# Patient Record
Sex: MALE | Race: WHITE | NOT HISPANIC OR LATINO | URBAN - METROPOLITAN AREA
[De-identification: names, ages, dates, MRNs, and addresses within clinical notes are randomized per-mention and may not be internally consistent; named-entity substitution may affect disease eponyms.]

---

## 2020-07-24 ENCOUNTER — WEB ENCOUNTER (OUTPATIENT)
Dept: URBAN - METROPOLITAN AREA CLINIC 80 | Facility: CLINIC | Age: 50
End: 2020-07-24

## 2020-07-24 ENCOUNTER — OFFICE VISIT (OUTPATIENT)
Dept: URBAN - METROPOLITAN AREA CLINIC 80 | Facility: CLINIC | Age: 50
End: 2020-07-24
Payer: COMMERCIAL

## 2020-07-24 DIAGNOSIS — K50.118 CROHN'S DISEASE OF LARGE INTESTINE WITH OTHER COMPLICATION: ICD-10-CM

## 2020-07-24 PROBLEM — 7620006: Status: ACTIVE | Noted: 2020-07-24

## 2020-07-24 PROCEDURE — 3017F COLORECTAL CA SCREEN DOC REV: CPT | Performed by: INTERNAL MEDICINE

## 2020-07-24 PROCEDURE — G9903 PT SCRN TBCO ID AS NON USER: HCPCS | Performed by: INTERNAL MEDICINE

## 2020-07-24 PROCEDURE — 99213 OFFICE O/P EST LOW 20 MIN: CPT | Performed by: INTERNAL MEDICINE

## 2020-07-24 PROCEDURE — G8427 DOCREV CUR MEDS BY ELIG CLIN: HCPCS | Performed by: INTERNAL MEDICINE

## 2020-07-24 PROCEDURE — G9716 BMI DOC ONL FUP NOT CMPLTD: HCPCS | Performed by: INTERNAL MEDICINE

## 2020-07-24 RX ORDER — OMEPRAZOLE 20 MG/1
CAPSULE, DELAYED RELEASE ORAL
Qty: 0 | Refills: 0 | Status: ACTIVE | COMMUNITY
Start: 1900-01-01 | End: 1900-01-01

## 2020-07-24 RX ORDER — PREDNISONE 10 MG/1
TABLET ORAL
Qty: 0 | Refills: 0 | Status: ACTIVE | COMMUNITY
Start: 1900-01-01 | End: 1900-01-01

## 2020-07-24 RX ORDER — MERCAPTOPURINE 50 MG/1
TAKE 2 TABLETS BY ORAL ROUTE DAILY FOR 90 DAYS TABLET ORAL 1
Qty: 180 | Refills: 1 | Status: ACTIVE | COMMUNITY
Start: 2020-02-17 | End: 2020-08-15

## 2020-07-24 RX ORDER — DEXLANSOPRAZOLE 60 MG/1
TAKE 1 CAPSULE (60 MG) BY ORAL ROUTE ONCE DAILY FOR 30 DAYS CAPSULE, DELAYED RELEASE ORAL 1
Qty: 30 | Refills: 2 | Status: ACTIVE | COMMUNITY
Start: 2017-11-22 | End: 1900-01-01

## 2020-07-24 RX ORDER — MERCAPTOPURINE 50 MG/1
AS DIRECTED TABLET ORAL
Qty: 180 | Refills: 3 | OUTPATIENT
Start: 2020-07-24

## 2020-07-25 LAB
A/G RATIO: 1.4
ALBUMIN: 4.2
ALKALINE PHOSPHATASE: 89
ALT (SGPT): 35
AST (SGOT): 23
BILIRUBIN, TOTAL: 0.3
BUN/CREATININE RATIO: 15
BUN: 15
C-REACTIVE PROTEIN, QUANT: 8
CALCIUM: 9.1
CARBON DIOXIDE, TOTAL: 23
CHLORIDE: 104
CREATININE: 0.98
EGFR IF AFRICN AM: 102
EGFR IF NONAFRICN AM: 88
GLOBULIN, TOTAL: 3
GLUCOSE: 119
HEMATOCRIT: 42
HEMOGLOBIN: 13.9
MCH: 32.3
MCHC: 33.1
MCV: 98
NRBC: (no result)
PLATELETS: 244
POTASSIUM: 3.9
PROTEIN, TOTAL: 7.2
RBC: 4.3
RDW: 13.9
SODIUM: 141
T4,FREE(DIRECT): 1.14
TSH: 1.95
WBC: 4.4

## 2020-10-13 ENCOUNTER — ERX REFILL RESPONSE (OUTPATIENT)
Age: 50
End: 2020-10-13

## 2020-10-13 RX ORDER — MERCAPTOPURINE 50 MG/1
TAKE 2 TABLETS BY MOUTH EVERY DAY TABLET ORAL
Qty: 180 | Refills: 0

## 2021-01-26 ENCOUNTER — ERX REFILL RESPONSE (OUTPATIENT)
Age: 51
End: 2021-01-26

## 2021-01-26 RX ORDER — MERCAPTOPURINE 50 MG/1
TAKE 2 TABLETS BY MOUTH EVERY DAY TABLET ORAL
Qty: 180 | Refills: 0

## 2021-01-29 ENCOUNTER — OFFICE VISIT (OUTPATIENT)
Dept: URBAN - METROPOLITAN AREA CLINIC 80 | Facility: CLINIC | Age: 51
End: 2021-01-29
Payer: COMMERCIAL

## 2021-01-29 ENCOUNTER — WEB ENCOUNTER (OUTPATIENT)
Dept: URBAN - METROPOLITAN AREA CLINIC 80 | Facility: CLINIC | Age: 51
End: 2021-01-29

## 2021-01-29 DIAGNOSIS — K50.812 CROHN'S DISEASE OF BOTH SMALL AND LARGE INTESTINE WITH INTESTINAL OBSTRUCTION: ICD-10-CM

## 2021-01-29 PROBLEM — 71833008: Status: ACTIVE | Noted: 2021-01-29

## 2021-01-29 PROCEDURE — 1036F TOBACCO NON-USER: CPT | Performed by: INTERNAL MEDICINE

## 2021-01-29 PROCEDURE — G8482 FLU IMMUNIZE ORDER/ADMIN: HCPCS | Performed by: INTERNAL MEDICINE

## 2021-01-29 PROCEDURE — G9903 PT SCRN TBCO ID AS NON USER: HCPCS | Performed by: INTERNAL MEDICINE

## 2021-01-29 PROCEDURE — G8427 DOCREV CUR MEDS BY ELIG CLIN: HCPCS | Performed by: INTERNAL MEDICINE

## 2021-01-29 PROCEDURE — G8417 CALC BMI ABV UP PARAM F/U: HCPCS | Performed by: INTERNAL MEDICINE

## 2021-01-29 PROCEDURE — 99213 OFFICE O/P EST LOW 20 MIN: CPT | Performed by: INTERNAL MEDICINE

## 2021-01-29 PROCEDURE — 3017F COLORECTAL CA SCREEN DOC REV: CPT | Performed by: INTERNAL MEDICINE

## 2021-01-29 RX ORDER — DEXLANSOPRAZOLE 60 MG/1
TAKE 1 CAPSULE (60 MG) BY ORAL ROUTE ONCE DAILY FOR 30 DAYS CAPSULE, DELAYED RELEASE ORAL 1
Qty: 30 | Refills: 2 | Status: ACTIVE | COMMUNITY
Start: 2017-11-22 | End: 1900-01-01

## 2021-01-29 RX ORDER — PREDNISONE 10 MG/1
TABLET ORAL
Qty: 0 | Refills: 0 | Status: ACTIVE | COMMUNITY
Start: 1900-01-01 | End: 1900-01-01

## 2021-01-29 RX ORDER — OMEPRAZOLE 20 MG/1
CAPSULE, DELAYED RELEASE ORAL
Qty: 0 | Refills: 0 | Status: ACTIVE | COMMUNITY
Start: 1900-01-01 | End: 1900-01-01

## 2021-01-29 RX ORDER — MERCAPTOPURINE 50 MG/1
TAKE 2 TABLETS BY MOUTH EVERY DAY TABLET ORAL
Qty: 180 | Refills: 0 | Status: ACTIVE | COMMUNITY

## 2021-01-29 RX ORDER — MERCAPTOPURINE 50 MG/1
TAKE 2 TABLETS BY MOUTH EVERY DAY TABLET ORAL DAILY
Qty: 180 | Refills: 2

## 2021-01-29 NOTE — HPI-TODAY'S VISIT:
He comes in today for interval follow up.  He has remained compliant with his 6MP.  He has no GI symptoms.  He is considering buying a new truck next year.

## 2021-01-30 LAB
A/G RATIO: 1.3
ALBUMIN: 4.4
ALKALINE PHOSPHATASE: 96
ALT (SGPT): 32
AST (SGOT): 20
BASO (ABSOLUTE): 0
BASOS: 0
BILIRUBIN, TOTAL: 0.3
BUN/CREATININE RATIO: 18
BUN: 18
C-REACTIVE PROTEIN, QUANT: 12
CALCIUM: 9.6
CARBON DIOXIDE, TOTAL: 25
CHLORIDE: 104
CREATININE: 1.02
EGFR IF AFRICN AM: 97
EGFR IF NONAFRICN AM: 84
EOS (ABSOLUTE): 0.1
EOS: 1
GLOBULIN, TOTAL: 3.3
GLUCOSE: 85
HEMATOCRIT: 42.7
HEMATOLOGY COMMENTS:: (no result)
HEMOGLOBIN: 14.9
IMMATURE CELLS: (no result)
IMMATURE GRANS (ABS): 0
IMMATURE GRANULOCYTES: 0
LYMPHS (ABSOLUTE): 1.2
LYMPHS: 22
MCH: 34.6
MCHC: 34.9
MCV: 99
MONOCYTES(ABSOLUTE): 0.7
MONOCYTES: 12
NEUTROPHILS (ABSOLUTE): 3.5
NEUTROPHILS: 65
NRBC: (no result)
PLATELETS: 266
POTASSIUM: 4.1
PROTEIN, TOTAL: 7.7
RBC: 4.31
RDW: 13.5
SODIUM: 142
WBC: 5.4

## 2021-08-24 ENCOUNTER — ERX REFILL RESPONSE (OUTPATIENT)
Dept: URBAN - METROPOLITAN AREA CLINIC 80 | Facility: CLINIC | Age: 51
End: 2021-08-24

## 2021-08-24 RX ORDER — MERCAPTOPURINE 50 MG/1
TAKE 2 TABLETS BY MOUTH EVERY DAY TABLET ORAL
Qty: 180 TABLET | Refills: 0 | OUTPATIENT

## 2021-08-24 RX ORDER — MERCAPTOPURINE 50 MG/1
TAKE 2 TABLETS BY MOUTH EVERY DAY TABLET ORAL
Qty: 180 TABLET | Refills: 1 | OUTPATIENT

## 2022-10-07 ENCOUNTER — OFFICE VISIT (OUTPATIENT)
Dept: URBAN - METROPOLITAN AREA CLINIC 80 | Facility: CLINIC | Age: 52
End: 2022-10-07
Payer: COMMERCIAL

## 2022-10-07 ENCOUNTER — LAB OUTSIDE AN ENCOUNTER (OUTPATIENT)
Dept: URBAN - METROPOLITAN AREA CLINIC 80 | Facility: CLINIC | Age: 52
End: 2022-10-07

## 2022-10-07 VITALS
TEMPERATURE: 97.3 F | SYSTOLIC BLOOD PRESSURE: 151 MMHG | HEART RATE: 86 BPM | WEIGHT: 289.2 LBS | DIASTOLIC BLOOD PRESSURE: 86 MMHG | BODY MASS INDEX: 41.4 KG/M2 | HEIGHT: 70 IN

## 2022-10-07 DIAGNOSIS — K50.918 CROHN'S DISEASE WITH OTHER COMPLICATION, UNSPECIFIED GASTROINTESTINAL TRACT LOCATION: ICD-10-CM

## 2022-10-07 PROBLEM — 34000006: Status: ACTIVE | Noted: 2022-10-07

## 2022-10-07 PROCEDURE — 99213 OFFICE O/P EST LOW 20 MIN: CPT | Performed by: INTERNAL MEDICINE

## 2022-10-07 RX ORDER — PREDNISONE 10 MG/1
TABLET ORAL
Qty: 0 | Refills: 0 | Status: ACTIVE | COMMUNITY
Start: 1900-01-01 | End: 1900-01-01

## 2022-10-07 RX ORDER — OMEPRAZOLE 20 MG/1
CAPSULE, DELAYED RELEASE ORAL
Qty: 0 | Refills: 0 | Status: ACTIVE | COMMUNITY
Start: 1900-01-01 | End: 1900-01-01

## 2022-10-07 RX ORDER — MERCAPTOPURINE 50 MG/1
TAKE 2 TABLETS BY MOUTH EVERY DAY TABLET ORAL
Qty: 180 TABLET | Refills: 1

## 2022-10-07 RX ORDER — DEXLANSOPRAZOLE 60 MG/1
TAKE 1 CAPSULE (60 MG) BY ORAL ROUTE ONCE DAILY FOR 30 DAYS CAPSULE, DELAYED RELEASE ORAL 1
Qty: 30 | Refills: 2 | Status: ACTIVE | COMMUNITY
Start: 2017-11-22 | End: 1900-01-01

## 2022-10-07 RX ORDER — MERCAPTOPURINE 50 MG/1
TAKE 2 TABLETS BY MOUTH EVERY DAY TABLET ORAL
Qty: 180 TABLET | Refills: 1 | Status: ACTIVE | COMMUNITY

## 2022-10-07 NOTE — HPI-TODAY'S VISIT:
He comes in today for interval follow up.  His GI symptoms remain well controlled on mercaptopurine alone.  He was able to find a brand new [employer] truck and loves it.  He is due for surveillance colonoscopy.

## 2022-10-11 LAB
A/G RATIO: 1.5
ABSOLUTE BASOPHILS: 29
ABSOLUTE EOSINOPHILS: 268
ABSOLUTE LYMPHOCYTES: 1465
ABSOLUTE MONOCYTES: 496
ABSOLUTE NEUTROPHILS: 3443
ALBUMIN: 4.2
ALKALINE PHOSPHATASE: 87
ALT (SGPT): 35
AST (SGOT): 20
BASOPHILS: 0.5
BILIRUBIN, TOTAL: 0.6
BUN/CREATININE RATIO: (no result)
BUN: 15
C-REACTIVE PROTEIN, QUANT: 11.6
CALCIUM: 9.5
CARBON DIOXIDE, TOTAL: 27
CHLORIDE: 106
CREATININE: 1.03
EGFR: 86
EOSINOPHILS: 4.7
GLOBULIN, TOTAL: 2.8
GLUCOSE: 91
HEMATOCRIT: 46.3
HEMOGLOBIN: 16
LYMPHOCYTES: 25.7
MCH: 31.4
MCHC: 34.6
MCV: 91
MONOCYTES: 8.7
MPV: 9.7
NEUTROPHILS: 60.4
PLATELET COUNT: 243
POTASSIUM: 4
PROTEIN, TOTAL: 7
RDW: 13.1
RED BLOOD CELL COUNT: 5.09
SODIUM: 141
WHITE BLOOD CELL COUNT: 5.7

## 2023-01-16 PROBLEM — 34000006 CROHN'S DISEASE: Status: ACTIVE | Noted: 2023-01-16

## 2023-05-08 ENCOUNTER — CLAIMS CREATED FROM THE CLAIM WINDOW (OUTPATIENT)
Dept: URBAN - METROPOLITAN AREA CLINIC 4 | Facility: CLINIC | Age: 53
End: 2023-05-08
Payer: COMMERCIAL

## 2023-05-08 ENCOUNTER — OFFICE VISIT (OUTPATIENT)
Dept: URBAN - METROPOLITAN AREA SURGERY CENTER 19 | Facility: SURGERY CENTER | Age: 53
End: 2023-05-08
Payer: COMMERCIAL

## 2023-05-08 DIAGNOSIS — Z09 ENCNTR FOR F/U EXAM AFT TRTMT FOR COND OTH THAN MALIG NEOPLM: ICD-10-CM

## 2023-05-08 DIAGNOSIS — K63.89 OTHER SPECIFIED DISEASES OF INTESTINE: ICD-10-CM

## 2023-05-08 DIAGNOSIS — K50.90 CROHN DISEASE: ICD-10-CM

## 2023-05-08 PROCEDURE — 88305 TISSUE EXAM BY PATHOLOGIST: CPT | Performed by: PATHOLOGY

## 2023-05-08 PROCEDURE — G8907 PT DOC NO EVENTS ON DISCHARG: HCPCS | Performed by: INTERNAL MEDICINE

## 2023-05-08 PROCEDURE — 45380 COLONOSCOPY AND BIOPSY: CPT | Performed by: INTERNAL MEDICINE

## 2023-05-22 ENCOUNTER — ERX REFILL RESPONSE (OUTPATIENT)
Dept: URBAN - METROPOLITAN AREA CLINIC 80 | Facility: CLINIC | Age: 53
End: 2023-05-22

## 2023-05-22 RX ORDER — MERCAPTOPURINE 50 MG/1
TAKE 2 TABLETS BY MOUTH EVERY DAY TABLET ORAL
Qty: 180 TABLET | Refills: 1 | OUTPATIENT

## 2023-11-17 ENCOUNTER — DASHBOARD ENCOUNTERS (OUTPATIENT)
Age: 53
End: 2023-11-17

## 2023-11-17 ENCOUNTER — OFFICE VISIT (OUTPATIENT)
Dept: URBAN - METROPOLITAN AREA CLINIC 80 | Facility: CLINIC | Age: 53
End: 2023-11-17
Payer: COMMERCIAL

## 2023-11-17 VITALS
DIASTOLIC BLOOD PRESSURE: 88 MMHG | HEART RATE: 82 BPM | HEIGHT: 70 IN | TEMPERATURE: 97.3 F | SYSTOLIC BLOOD PRESSURE: 151 MMHG | BODY MASS INDEX: 41.72 KG/M2 | WEIGHT: 291.4 LBS

## 2023-11-17 DIAGNOSIS — K50.919 CROHN'S DISEASE WITH COMPLICATION, UNSPECIFIED GASTROINTESTINAL TRACT LOCATION: ICD-10-CM

## 2023-11-17 PROBLEM — 34000006: Status: ACTIVE | Noted: 2023-11-17

## 2023-11-17 PROCEDURE — 99214 OFFICE O/P EST MOD 30 MIN: CPT | Performed by: INTERNAL MEDICINE

## 2023-11-17 RX ORDER — DEXLANSOPRAZOLE 60 MG/1
TAKE 1 CAPSULE (60 MG) BY ORAL ROUTE ONCE DAILY FOR 30 DAYS CAPSULE, DELAYED RELEASE ORAL 1
Qty: 30 | Refills: 2 | Status: DISCONTINUED | COMMUNITY
Start: 2017-11-22

## 2023-11-17 RX ORDER — MERCAPTOPURINE 50 MG/1
TAKE 2 TABLETS BY MOUTH EVERY DAY TABLET ORAL
Qty: 180 TABLET | Refills: 1 | Status: ACTIVE | COMMUNITY

## 2023-11-17 RX ORDER — OMEPRAZOLE 20 MG/1
CAPSULE, DELAYED RELEASE ORAL
Qty: 0 | Refills: 0 | Status: DISCONTINUED | COMMUNITY
Start: 1900-01-01

## 2023-11-17 RX ORDER — PREDNISONE 10 MG/1
TABLET ORAL
Qty: 0 | Refills: 0 | Status: DISCONTINUED | COMMUNITY
Start: 1900-01-01

## 2023-11-17 RX ORDER — MERCAPTOPURINE 50 MG/1
TAKE 2 TABLETS BY MOUTH EVERY DAY TABLET ORAL
Qty: 180 TABLET | Refills: 3

## 2023-11-17 NOTE — HPI-TODAY'S VISIT:
He comes in today for routine follow up.  Overall he is doing very well and has no GI complaints.  He is traveling a lot. His son recently moved to [place].  He is compliant with his medications.  Colonoscopy in May showed no evidence of Crohn's.

## 2023-11-22 LAB
A/G RATIO: 1.4
ABSOLUTE BASOPHILS: 22
ABSOLUTE EOSINOPHILS: 112
ABSOLUTE LYMPHOCYTES: 1271
ABSOLUTE MONOCYTES: 543
ABSOLUTE NEUTROPHILS: 3651
ALBUMIN: 4.2
ALKALINE PHOSPHATASE: 84
ALT (SGPT): 35
AST (SGOT): 21
BASOPHILS: 0.4
BILIRUBIN, TOTAL: 0.5
BUN/CREATININE RATIO: (no result)
BUN: 23
C-REACTIVE PROTEIN, QUANT: 12.5
CALCIUM: 8.8
CARBON DIOXIDE, TOTAL: 25
CHLORIDE: 103
CREATININE: 0.93
EGFR: 97
EOSINOPHILS: 2
GLOBULIN, TOTAL: 3
GLUCOSE: 100
HEMATOCRIT: 44.3
HEMOGLOBIN: 15.5
LYMPHOCYTES: 22.7
MCH: 33.8
MCHC: 35
MCV: 96.5
MONOCYTES: 9.7
MPV: 10
NEUTROPHILS: 65.2
PLATELET COUNT: 229
POTASSIUM: 4.2
PROTEIN, TOTAL: 7.2
RDW: 13.1
RED BLOOD CELL COUNT: 4.59
SODIUM: 141
WHITE BLOOD CELL COUNT: 5.6

## 2023-11-29 ENCOUNTER — TELEPHONE ENCOUNTER (OUTPATIENT)
Dept: URBAN - METROPOLITAN AREA CLINIC 6 | Facility: CLINIC | Age: 53
End: 2023-11-29

## 2025-01-10 ENCOUNTER — OFFICE VISIT (OUTPATIENT)
Dept: URBAN - METROPOLITAN AREA TELEHEALTH 2 | Facility: TELEHEALTH | Age: 55
End: 2025-01-10
Payer: COMMERCIAL

## 2025-01-10 VITALS — WEIGHT: 300 LBS | HEIGHT: 70 IN | BODY MASS INDEX: 42.95 KG/M2

## 2025-01-10 DIAGNOSIS — K50.918 CROHN'S DISEASE WITH OTHER COMPLICATION, UNSPECIFIED GASTROINTESTINAL TRACT LOCATION: ICD-10-CM

## 2025-01-10 PROCEDURE — 98005 SYNCH AUDIO-VIDEO EST LOW 20: CPT | Performed by: INTERNAL MEDICINE

## 2025-01-10 PROCEDURE — 99443 PHONE E/M BY PHYS 21-30 MIN: CPT | Performed by: INTERNAL MEDICINE

## 2025-01-10 RX ORDER — MERCAPTOPURINE 50 MG/1
TAKE 2 TABLETS BY MOUTH EVERY DAY TABLET ORAL
Qty: 180 TABLET | Refills: 3 | Status: ACTIVE | COMMUNITY

## 2025-01-10 NOTE — HPI-TODAY'S VISIT:
He requests telehealth today to discuss his Crohn's disease.  He is currently doing well and is in clinical remission.  He is also is attempting to get additional [employer] benefits and is requesting a letter about his medical history.  He reports having symptoms of Crohn's long before his diagnosis.  He was also diagnosed with erythema nodusum in the late 1990's and required prednisone at that time of a regular basis.  He will be due for colonoscopy in May.

## 2025-02-03 ENCOUNTER — TELEPHONE ENCOUNTER (OUTPATIENT)
Dept: URBAN - METROPOLITAN AREA CLINIC 80 | Facility: CLINIC | Age: 55
End: 2025-02-03

## 2025-02-03 RX ORDER — MERCAPTOPURINE 50 MG/1
TAKE 2 TABLETS BY MOUTH EVERY DAY TABLET ORAL
Qty: 180 TABLET | Refills: 3

## 2025-05-02 ENCOUNTER — OFFICE VISIT (OUTPATIENT)
Dept: URBAN - METROPOLITAN AREA SURGERY CENTER 19 | Facility: SURGERY CENTER | Age: 55
End: 2025-05-02
Payer: COMMERCIAL

## 2025-05-02 DIAGNOSIS — K50.80 CROHN'S DISEASE OF BOTH SMALL AND LARGE INTESTINE WITHOUT COMPLICATION: ICD-10-CM

## 2025-05-02 DIAGNOSIS — Z98.0 INTESTINAL BYPASS AND ANASTOMOSIS STATUS: ICD-10-CM

## 2025-05-02 DIAGNOSIS — K50.90 CROHN'S DISEASE WITHOUT COMPLICATION, UNSPECIFIED GASTROINTESTINAL TRACT LOCATION: ICD-10-CM

## 2025-05-02 PROCEDURE — 45380 COLONOSCOPY AND BIOPSY: CPT | Performed by: INTERNAL MEDICINE

## 2025-05-02 PROCEDURE — 00811 ANES LWR INTST NDSC NOS: CPT | Performed by: NURSE ANESTHETIST, CERTIFIED REGISTERED

## 2025-05-02 RX ORDER — MERCAPTOPURINE 50 MG/1
TAKE 2 TABLETS BY MOUTH EVERY DAY TABLET ORAL
Qty: 180 TABLET | Refills: 3 | Status: ACTIVE | COMMUNITY